# Patient Record
Sex: MALE | Race: WHITE | NOT HISPANIC OR LATINO | URBAN - METROPOLITAN AREA
[De-identification: names, ages, dates, MRNs, and addresses within clinical notes are randomized per-mention and may not be internally consistent; named-entity substitution may affect disease eponyms.]

---

## 2017-05-06 ENCOUNTER — GENERIC CONVERSION - ENCOUNTER (OUTPATIENT)
Dept: OTHER | Facility: OTHER | Age: 27
End: 2017-05-06

## 2017-05-30 ENCOUNTER — ALLSCRIPTS OFFICE VISIT (OUTPATIENT)
Dept: OTHER | Facility: OTHER | Age: 27
End: 2017-05-30

## 2017-06-06 ENCOUNTER — ALLSCRIPTS OFFICE VISIT (OUTPATIENT)
Dept: OTHER | Facility: OTHER | Age: 27
End: 2017-06-06

## 2017-06-08 ENCOUNTER — ALLSCRIPTS OFFICE VISIT (OUTPATIENT)
Dept: OTHER | Facility: OTHER | Age: 27
End: 2017-06-08

## 2017-07-10 ENCOUNTER — ALLSCRIPTS OFFICE VISIT (OUTPATIENT)
Dept: OTHER | Facility: OTHER | Age: 27
End: 2017-07-10

## 2017-07-20 ENCOUNTER — APPOINTMENT (OUTPATIENT)
Dept: RADIOLOGY | Facility: CLINIC | Age: 27
End: 2017-07-20
Payer: COMMERCIAL

## 2017-07-20 ENCOUNTER — ALLSCRIPTS OFFICE VISIT (OUTPATIENT)
Dept: OTHER | Facility: OTHER | Age: 27
End: 2017-07-20

## 2017-07-20 DIAGNOSIS — M25.519 PAIN IN SHOULDER: ICD-10-CM

## 2017-07-20 DIAGNOSIS — G25.89 OTHER SPECIFIED EXTRAPYRAMIDAL AND MOVEMENT DISORDERS: ICD-10-CM

## 2017-07-20 PROCEDURE — 73030 X-RAY EXAM OF SHOULDER: CPT

## 2018-01-10 NOTE — PROGRESS NOTES
Chief Complaint  Pt  in office today for a PPD read  Negative result-0 00mm induration  Active Problems    1  Acute bacterial conjunctivitis of right eye (372 03) (H10 31)   2  Acute sinusitis (461 9) (J01 90)   3  Arthralgia (719 40) (M25 50)   4  Need for Tdap vaccination (V06 1) (Z23)   5  Otitic barotrauma (993 0) (T70 0XXA)   6  PPD screening test (V74 1) (Z11 1)   7  Screening for hyperlipidemia (V77 91) (Z13 220)   8  Tonsil stone (474 8) (J35 8)    Current Meds   1  No Reported Medications Recorded    Allergies    1   No Known Drug Allergies    Plan  PPD screening test    · PPD    Signatures   Electronically signed by : BOLA Fuller ; Jun 8 2017  2:39PM EST

## 2018-01-10 NOTE — PROGRESS NOTES
Chief Complaint  Pt  in office today for a polio vaccine  Active Problems    1  Acute bacterial conjunctivitis of right eye (372 03) (H10 31)   2  Acute sinusitis (461 9) (J01 90)   3  Arthralgia (719 40) (M25 50)   4  Need for polio vaccination (V04 0) (Z23)   5  Need for Tdap vaccination (V06 1) (Z23)   6  Otitic barotrauma (993 0) (T70 0XXA)   7  PPD screening test (V74 1) (Z11 1)   8  Screening for hyperlipidemia (V77 91) (Z13 220)   9  Tonsil stone (474 8) (J35 8)    Current Meds   1  No Reported Medications Recorded    Allergies    1  No Known Drug Allergies    Plan  Need for polio vaccination    · IPV    Future Appointments    Date/Time Provider Specialty Site   07/20/2017 04:00 PM BOLA Hill   Orthopedic Surgery [facility]     Signatures   Electronically signed by : BOLA Tatum ; Jul 10 2017  6:29PM EST

## 2018-01-13 VITALS
HEIGHT: 76 IN | SYSTOLIC BLOOD PRESSURE: 126 MMHG | BODY MASS INDEX: 24.96 KG/M2 | WEIGHT: 205.01 LBS | HEART RATE: 99 BPM | DIASTOLIC BLOOD PRESSURE: 84 MMHG | OXYGEN SATURATION: 97 %

## 2018-01-14 VITALS
HEIGHT: 76 IN | BODY MASS INDEX: 24.96 KG/M2 | WEIGHT: 205 LBS | SYSTOLIC BLOOD PRESSURE: 122 MMHG | HEART RATE: 81 BPM | DIASTOLIC BLOOD PRESSURE: 82 MMHG

## 2018-01-14 NOTE — PROGRESS NOTES
Assessment    1  Encounter for preventive health examination (V70 0) (Z00 00)    Discussion/Summary  Impression: health maintenance visit  Prostate cancer screening: PSA is not indicated  Testicular cancer screening: the risks and benefits of testicular cancer screening were discussed  Colorectal cancer screening: colorectal cancer screening is not indicated  The immunizations are up to date  Patient is in good health for employment  Chief Complaint  Pt  in office today for a check up  Pt  states he needs Hep A, Hep B, TDAP, and PPD  Pt  may need HEP A prescription since Adult Hep A is not carried in office  History of Present Illness  HM, Adult Male:   General Health:   Screening: cancer screening reviewed and current  metabolic screening reviewed and current  risk screening reviewed and current  HPI: Comes in for preemployment physical for working with the [employer]  Active Problems    1  Acute bacterial conjunctivitis of right eye (372 03) (H10 31)   2  Acute sinusitis (461 9) (J01 90)   3  Arthralgia (719 40) (M25 50)   4  Otitic barotrauma (993 0) (T70 0XXA)   5  Screening for hyperlipidemia (V77 91) (Z13 220)   6  Tonsil stone (474 8) (J35 8)    Family History  Mother    · No pertinent family history  Father    · No pertinent family history    Social History    · Never a smoker    Current Meds   1  No Reported Medications Recorded    Allergies    1  No Known Drug Allergies    Vitals   Recorded: 06Jun2017 11:41AM   Heart Rate 77   Systolic 045   Diastolic 82   Height 6 ft 4 in   Weight 201 lb    BMI Calculated 24 47   BSA Calculated 2 22   O2 Saturation 98     Physical Exam    Constitutional   General appearance: No acute distress, well appearing and well nourished  Eyes   Conjunctiva and lids: No swelling, erythema, or discharge  Pupils and irises: Equal, round and reactive to light      Ears, Nose, Mouth, and Throat   External inspection of ears and nose: Normal     Otoscopic examination: Tympanic membrance translucent with normal light reflex  Canals patent without erythema  Oropharynx: Normal with no erythema, edema, exudate or lesions  Pulmonary   Respiratory effort: No increased work of breathing or signs of respiratory distress  Auscultation of lungs: Clear to auscultation  Cardiovascular   Palpation of heart: Normal PMI, no thrills  Auscultation of heart: Normal rate and rhythm, normal S1 and S2, without murmurs  Examination of extremities for edema and/or varicosities: Normal     Abdomen   Abdomen: Non-tender, no masses  Liver and spleen: No hepatomegaly or splenomegaly  Lymphatic   Palpation of lymph nodes in neck: No lymphadenopathy  Musculoskeletal   Gait and station: Normal     Digits and nails: Normal without clubbing or cyanosis  Inspection/palpation of joints, bones, and muscles: Normal     Skin   Skin and subcutaneous tissue: Normal without rashes or lesions  Neurologic   Cranial nerves: Cranial nerves 2-12 intact  Reflexes: 2+ and symmetric  Sensation: No sensory loss      Psychiatric   Orientation to person, place and time: Normal     Mood and affect: Normal        Signatures   Electronically signed by : BOLA [place] ; Jun 6 2017 12:07PM EST                       (Author)

## 2018-01-22 VITALS
HEIGHT: 76 IN | BODY MASS INDEX: 24.48 KG/M2 | SYSTOLIC BLOOD PRESSURE: 130 MMHG | HEART RATE: 77 BPM | WEIGHT: 201 LBS | DIASTOLIC BLOOD PRESSURE: 82 MMHG | OXYGEN SATURATION: 98 %

## 2023-12-20 ENCOUNTER — EVALUATION (OUTPATIENT)
Dept: PHYSICAL THERAPY | Facility: CLINIC | Age: 33
End: 2023-12-20
Payer: COMMERCIAL

## 2023-12-20 VITALS — DIASTOLIC BLOOD PRESSURE: 98 MMHG | SYSTOLIC BLOOD PRESSURE: 141 MMHG

## 2023-12-20 DIAGNOSIS — M54.2 NECK PAIN: Primary | ICD-10-CM

## 2023-12-20 PROCEDURE — 97161 PT EVAL LOW COMPLEX 20 MIN: CPT | Performed by: PHYSICAL THERAPIST

## 2023-12-20 PROCEDURE — 97110 THERAPEUTIC EXERCISES: CPT | Performed by: PHYSICAL THERAPIST
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
## 2023-12-27 ENCOUNTER — OFFICE VISIT (OUTPATIENT)
Dept: PHYSICAL THERAPY | Facility: CLINIC | Age: 33
End: 2023-12-27
Payer: COMMERCIAL

## 2023-12-27 DIAGNOSIS — M54.2 NECK PAIN: Primary | ICD-10-CM

## 2023-12-27 PROCEDURE — 97112 NEUROMUSCULAR REEDUCATION: CPT | Performed by: PHYSICAL THERAPIST

## 2023-12-27 PROCEDURE — 97140 MANUAL THERAPY 1/> REGIONS: CPT | Performed by: PHYSICAL THERAPIST

## 2023-12-27 PROCEDURE — 97110 THERAPEUTIC EXERCISES: CPT | Performed by: PHYSICAL THERAPIST

## 2024-01-03 ENCOUNTER — OFFICE VISIT (OUTPATIENT)
Dept: PHYSICAL THERAPY | Facility: CLINIC | Age: 34
End: 2024-01-03
Payer: COMMERCIAL

## 2024-01-03 DIAGNOSIS — M54.2 NECK PAIN: Primary | ICD-10-CM

## 2024-01-03 PROCEDURE — 97110 THERAPEUTIC EXERCISES: CPT | Performed by: PHYSICAL THERAPIST

## 2024-01-03 PROCEDURE — 97140 MANUAL THERAPY 1/> REGIONS: CPT | Performed by: PHYSICAL THERAPIST

## 2024-01-03 NOTE — PROGRESS NOTES
"Daily Note     Today's date: 1/3/2024  Patient name: Yang Soliman  : 1990  MRN: [id number]  Referring provider: Justin Ruiz MD  Dx:   Encounter Diagnosis     ICD-10-CM    1. Neck pain  M54.2                      Subjective: Pt reports he is 60-70% improved since beginning PT. He reports only two instances since IE of having a recurrence of his posterior head pain. He states that he is doing his HEP.       Objective: See treatment diary below      Assessment: Tolerated treatment well. ROM now WNL without recreation of symptoms. Assessed PAIVM- only slight tenderness at the subocc mm, otherwise WNL. Reviewed HEP and work ergonomics. Patient would benefit from continued PT      Plan: Continue per plan of care.  Progress treatment as tolerated.       Precautions: None    POC expires Unit limit Auth Expiration date PT/OT/ST + Visit Limit?   24 4 none 50                             Therapeutic exercise = (TE)  Therapeutic activity= (TA)  Manual Therapy= (MT)  Neuromuscular re-education- (NRE)  Gait training= (GT)    MT 1. R UPA C1- gd III RAMESH 3x1'  *L ROT- 60d  TE 2. Standing OA stretch against wall- 10x5\"  *L ROT- 65d    1/3:  TE 1. Reassessment of cervical ROM- WNL in all directions and no increase in symptoms with OP  MT 2. PAIVM assessment- slight tenderness bilateral subocc muscles- however, overall minimal response  TE 3. Standing OA stretch against wall- 10x5\"- discussion on how to rotate the head to make it unilateral   TE 4. Discussion on work ergonomics    [email or web address]  Access Code: [email or web address]           "

## 2024-01-18 ENCOUNTER — OFFICE VISIT (OUTPATIENT)
Dept: PHYSICAL THERAPY | Facility: CLINIC | Age: 34
End: 2024-01-18
Payer: COMMERCIAL

## 2024-01-18 DIAGNOSIS — M54.2 NECK PAIN: Primary | ICD-10-CM

## 2024-01-18 PROCEDURE — 97110 THERAPEUTIC EXERCISES: CPT | Performed by: PHYSICAL THERAPIST

## 2024-10-01 ENCOUNTER — HOSPITAL ENCOUNTER (OUTPATIENT)
Dept: ULTRASOUND IMAGING | Facility: HOSPITAL | Age: 34
Discharge: HOME | End: 2024-10-01
Payer: COMMERCIAL

## 2024-10-01 DIAGNOSIS — K40.90 UNILATERAL INGUINAL HERNIA, WITHOUT OBSTRUCTION OR GANGRENE, NOT SPECIFIED AS RECURRENT: ICD-10-CM

## 2024-10-01 PROCEDURE — 76705 ECHO EXAM OF ABDOMEN: CPT

## 2024-11-12 ENCOUNTER — OFFICE VISIT (OUTPATIENT)
Dept: SURGERY | Facility: CLINIC | Age: 34
End: 2024-11-12
Payer: COMMERCIAL

## 2024-11-12 VITALS
HEART RATE: 110 BPM | BODY MASS INDEX: 25.82 KG/M2 | HEIGHT: 76 IN | TEMPERATURE: 97.5 F | WEIGHT: 212 LBS | RESPIRATION RATE: 18 BRPM | DIASTOLIC BLOOD PRESSURE: 72 MMHG | OXYGEN SATURATION: 96 % | SYSTOLIC BLOOD PRESSURE: 120 MMHG

## 2024-11-12 DIAGNOSIS — K40.90 RIGHT INGUINAL HERNIA: Primary | ICD-10-CM

## 2024-11-12 PROCEDURE — 99203 OFFICE O/P NEW LOW 30 MIN: CPT | Performed by: SURGERY

## 2024-11-12 RX ORDER — HEPARIN SODIUM 5000 [USP'U]/ML
5000 INJECTION, SOLUTION INTRAVENOUS; SUBCUTANEOUS ONCE
OUTPATIENT
Start: 2024-11-12 | End: 2024-11-12

## 2024-11-12 RX ORDER — SODIUM CHLORIDE, SODIUM LACTATE, POTASSIUM CHLORIDE, CALCIUM CHLORIDE 600; 310; 30; 20 MG/100ML; MG/100ML; MG/100ML; MG/100ML
125 INJECTION, SOLUTION INTRAVENOUS CONTINUOUS
OUTPATIENT
Start: 2024-11-12

## 2024-11-12 NOTE — PROGRESS NOTES
Consult- General Surgery   Yang Soliman 34 y.o. male MRN: [id number]  Unit/Bed#:  Encounter: [id number]    Assessment & Plan     Assessment:  Right inguinal hernia  Plan:  In light of the size and symptoms I advised the patient to undergo laparoscopic preperitoneal right inguinal hernia repair with mesh in the near future.  I discussed the operative procedure, risk, benefits and alternatives, but not limited to bleeding, infection after surgery, recurrence, injury to adjacent organs, need for furher operations, loss of time from work, the patient understood and agreed to proceed with the above surgery.    History of Present Illness     HPI:  Yang Soliman is a 34 y.o. male who presents to my office for evaluation of right inguinal hernia.  The patient stated that he noted a bulge from the right groin after taking a shower.  He does have occasional discomfort, he denies having any nausea, vomiting, diarrhea, constipation or urinary symptoms.  He was seen by his primary care physician and referred to us for surgical evaluation.  He also had an ultrasound of the groin revealing right inguinal hernia.    Review of Systems  The rest of the review of system total of 10 were negative except for the HPI.    Historical Information   History reviewed. No pertinent past medical history.  Past Surgical History:   Procedure Laterality Date    HAND SURGERY Right     WISDOM TOOTH EXTRACTION       Social History   Social History     Substance and Sexual Activity   Alcohol Use Yes    Comment: occas     Social History     Substance and Sexual Activity   Drug Use Never     Social History     Tobacco Use   Smoking Status Never    Passive exposure: Never   Smokeless Tobacco Never     Family History: Family history non-contributory    Meds/Allergies   all medications and allergies reviewed   No current outpatient medications on file.  No Known Allergies    Objective     Current Vitals:   Blood Pressure: 120/72 (11/12/24 1054)  Pulse:  "(!) 110 (11/12/24 1054)  Temperature: 97.5 °F (36.4 °C) (11/12/24 1054)  Respirations: 18 (11/12/24 1054)  Height: 6' 4\" (193 cm) (11/12/24 1054)  Weight - Scale: 96.2 kg (212 lb) (11/12/24 1054)  SpO2: 96 % (11/12/24 1054)    Physical Exam  Vitals and nursing note reviewed.   Constitutional:       General: He is not in acute distress.  Cardiovascular:      Rate and Rhythm: Regular rhythm. Tachycardia present.   Pulmonary:      Effort: No respiratory distress.      Breath sounds: Normal breath sounds.   Abdominal:      Palpations: Abdomen is soft. There is no mass.      Tenderness: There is no abdominal tenderness.      Comments: There is an obvious bulge in the right groin consistent with a right inguinal hernia.  Digital examination of the left inguinal canal with Valsalva maneuver failed to reveal left inguinal hernia.   Skin:     General: Skin is warm.      Coloration: Skin is not jaundiced.      Findings: No erythema or rash.   Neurological:      Mental Status: He is alert and oriented to person, place, and time.      Cranial Nerves: No cranial nerve deficit.   Psychiatric:         Mood and Affect: Mood normal.         Behavior: Behavior normal.       Imaging: Results Review Statement: I personally reviewed the following image studies in PACS and associated radiology reports: Ultrasound(s). My interpretation of the radiology images/reports is: Agree with report.  Procedure: US groin/inguinal area    Result Date: 10/2/2024  Narrative: ABDOMINAL WALL/INGUINAL ULTRASOUND INDICATION: K40.90: Unilateral inguinal hernia, without obstruction or gangrene, not specified as recurrent. Patient reports bulging in the right inguinal region, more prominent when standing. COMPARISON: No similar prior studies available for direct comparison at time of dictation. TECHNIQUE: Real-time ultrasound of the right inguinal region was performed with a high-frequency transducer with both volumetric sweeps and still imaging techniques. " FINDINGS: There is a small, fat-containing right inguinal hernia corresponding to the palpable abnormality.     Impression: Small, fat-containing right inguinal hernia corresponding to the palpable abnormality. Workstation performed: [id number]

## 2024-11-12 NOTE — H&P (VIEW-ONLY)
Consult- General Surgery   Yang Soliman 34 y.o. male MRN: [id number]  Unit/Bed#:  Encounter: [id number]    Assessment & Plan     Assessment:  Right inguinal hernia  Plan:  In light of the size and symptoms I advised the patient to undergo laparoscopic preperitoneal right inguinal hernia repair with mesh in the near future.  I discussed the operative procedure, risk, benefits and alternatives, but not limited to bleeding, infection after surgery, recurrence, injury to adjacent organs, need for furher operations, loss of time from work, the patient understood and agreed to proceed with the above surgery.    History of Present Illness     HPI:  Yang Soliman is a 34 y.o. male who presents to my office for evaluation of right inguinal hernia.  The patient stated that he noted a bulge from the right groin after taking a shower.  He does have occasional discomfort, he denies having any nausea, vomiting, diarrhea, constipation or urinary symptoms.  He was seen by his primary care physician and referred to us for surgical evaluation.  He also had an ultrasound of the groin revealing right inguinal hernia.    Review of Systems  The rest of the review of system total of 10 were negative except for the HPI.    Historical Information   History reviewed. No pertinent past medical history.  Past Surgical History:   Procedure Laterality Date    HAND SURGERY Right     WISDOM TOOTH EXTRACTION       Social History   Social History     Substance and Sexual Activity   Alcohol Use Yes    Comment: occas     Social History     Substance and Sexual Activity   Drug Use Never     Social History     Tobacco Use   Smoking Status Never    Passive exposure: Never   Smokeless Tobacco Never     Family History: Family history non-contributory    Meds/Allergies   all medications and allergies reviewed   No current outpatient medications on file.  No Known Allergies    Objective     Current Vitals:   Blood Pressure: 120/72 (11/12/24 1054)  Pulse:  "(!) 110 (11/12/24 1054)  Temperature: 97.5 °F (36.4 °C) (11/12/24 1054)  Respirations: 18 (11/12/24 1054)  Height: 6' 4\" (193 cm) (11/12/24 1054)  Weight - Scale: 96.2 kg (212 lb) (11/12/24 1054)  SpO2: 96 % (11/12/24 1054)    Physical Exam  Vitals and nursing note reviewed.   Constitutional:       General: He is not in acute distress.  Cardiovascular:      Rate and Rhythm: Regular rhythm. Tachycardia present.   Pulmonary:      Effort: No respiratory distress.      Breath sounds: Normal breath sounds.   Abdominal:      Palpations: Abdomen is soft. There is no mass.      Tenderness: There is no abdominal tenderness.      Comments: There is an obvious bulge in the right groin consistent with a right inguinal hernia.  Digital examination of the left inguinal canal with Valsalva maneuver failed to reveal left inguinal hernia.   Skin:     General: Skin is warm.      Coloration: Skin is not jaundiced.      Findings: No erythema or rash.   Neurological:      Mental Status: He is alert and oriented to person, place, and time.      Cranial Nerves: No cranial nerve deficit.   Psychiatric:         Mood and Affect: Mood normal.         Behavior: Behavior normal.       Imaging: Results Review Statement: I personally reviewed the following image studies in PACS and associated radiology reports: Ultrasound(s). My interpretation of the radiology images/reports is: Agree with report.  Procedure: US groin/inguinal area    Result Date: 10/2/2024  Narrative: ABDOMINAL WALL/INGUINAL ULTRASOUND INDICATION: K40.90: Unilateral inguinal hernia, without obstruction or gangrene, not specified as recurrent. Patient reports bulging in the right inguinal region, more prominent when standing. COMPARISON: No similar prior studies available for direct comparison at time of dictation. TECHNIQUE: Real-time ultrasound of the right inguinal region was performed with a high-frequency transducer with both volumetric sweeps and still imaging techniques. " FINDINGS: There is a small, fat-containing right inguinal hernia corresponding to the palpable abnormality.     Impression: Small, fat-containing right inguinal hernia corresponding to the palpable abnormality. Workstation performed: [id number]

## 2024-11-13 LAB
ALBUMIN SERPL-MCNC: 4.9 G/DL (ref 3.6–5.1)
ALBUMIN/GLOB SERPL: 2 (CALC) (ref 1–2.5)
ALP SERPL-CCNC: 69 U/L (ref 36–130)
ALT SERPL-CCNC: 43 U/L (ref 9–46)
AST SERPL-CCNC: 27 U/L (ref 10–40)
BASOPHILS # BLD AUTO: 40 CELLS/UL (ref 0–200)
BASOPHILS NFR BLD AUTO: 0.6 %
BILIRUB SERPL-MCNC: 0.8 MG/DL (ref 0.2–1.2)
BUN SERPL-MCNC: 8 MG/DL (ref 7–25)
BUN/CREAT SERPL: NORMAL (CALC) (ref 6–22)
CALCIUM SERPL-MCNC: 9.6 MG/DL (ref 8.6–10.3)
CHLORIDE SERPL-SCNC: 100 MMOL/L (ref 98–110)
CO2 SERPL-SCNC: 31 MMOL/L (ref 20–32)
CREAT SERPL-MCNC: 0.97 MG/DL (ref 0.6–1.26)
EOSINOPHIL # BLD AUTO: 271 CELLS/UL (ref 15–500)
EOSINOPHIL NFR BLD AUTO: 4.1 %
ERYTHROCYTE [DISTWIDTH] IN BLOOD BY AUTOMATED COUNT: 12.5 % (ref 11–15)
GFR/BSA.PRED SERPLBLD CYS-BASED-ARV: 104 ML/MIN/1.73M2
GLOBULIN SER CALC-MCNC: 2.5 G/DL (CALC) (ref 1.9–3.7)
GLUCOSE SERPL-MCNC: 83 MG/DL (ref 65–99)
HCT VFR BLD AUTO: 49.7 % (ref 38.5–50)
HGB BLD-MCNC: 17.5 G/DL (ref 13.2–17.1)
LYMPHOCYTES # BLD AUTO: 1822 CELLS/UL (ref 850–3900)
LYMPHOCYTES NFR BLD AUTO: 27.6 %
MCH RBC QN AUTO: 31 PG (ref 27–33)
MCHC RBC AUTO-ENTMCNC: 35.2 G/DL (ref 32–36)
MCV RBC AUTO: 88 FL (ref 80–100)
MONOCYTES # BLD AUTO: 653 CELLS/UL (ref 200–950)
MONOCYTES NFR BLD AUTO: 9.9 %
NEUTROPHILS # BLD AUTO: 3815 CELLS/UL (ref 1500–7800)
NEUTROPHILS NFR BLD AUTO: 57.8 %
PLATELET # BLD AUTO: 181 THOUSAND/UL (ref 140–400)
PMV BLD REES-ECKER: 10.5 FL (ref 7.5–12.5)
POTASSIUM SERPL-SCNC: 4 MMOL/L (ref 3.5–5.3)
PROT SERPL-MCNC: 7.4 G/DL (ref 6.1–8.1)
RBC # BLD AUTO: 5.65 MILLION/UL (ref 4.2–5.8)
SODIUM SERPL-SCNC: 140 MMOL/L (ref 135–146)
WBC # BLD AUTO: 6.6 THOUSAND/UL (ref 3.8–10.8)

## 2024-11-21 NOTE — PRE-PROCEDURE INSTRUCTIONS
No outpatient medications have been marked as taking for the 11/26/24 encounter (Hospital Encounter).    Pt does not take any medications.    Medication instructions for day surgery reviewed. Please use only a sip of water to take your instructed medications. Avoid all over the counter vitamins, supplements and NSAIDS for one week prior to surgery per anesthesia guidelines. Tylenol is ok to take as needed.     You will receive a call one business day prior to surgery with an arrival time and hospital directions. If your surgery is scheduled on a Monday, the hospital will be calling you on the Friday prior to your surgery. If you have not heard from anyone by 8pm, please call the hospital supervisor through the hospital  at [phone]. ([place] [phone] or [place] [phone]).    Do not eat or drink anything after midnight the night before your surgery, including candy, mints, lifesavers, or chewing gum. Do not drink alcohol 24hrs before your surgery. Try not to smoke at least 24hrs before your surgery.       Follow the pre surgery showering instructions as listed in the “My Surgical Experience Booklet” or otherwise provided by your surgeon's office. Do not use a blade to shave the surgical area 1 week before surgery. It is okay to use a clean electric clippers up to 24 hours before surgery. Do not apply any lotions, creams, including makeup, cologne, deodorant, or perfumes after showering on the day of your surgery. Do not use dry shampoo, hair spray, hair gel, or any type of hair products.     No contact lenses, eye make-up, or artificial eyelashes. Remove nail polish, including gel polish, and any artificial, gel, or acrylic nails if possible. Remove all jewelry including rings and body piercing jewelry.     Wear causal clothing that is easy to take on and off. Consider your type of surgery.    Keep any valuables, jewelry, piercings at home. Please bring any specially ordered equipment (sling,  braces) if indicated.    Arrange for a responsible person to drive you to and from the hospital on the day of your surgery. Please confirm the visitor policy for the day of your procedure when you receive your phone call with an arrival time.     Call the surgeon's office with any new illnesses, exposures, or additional questions prior to surgery.    Please reference your “My Surgical Experience Booklet” for additional information to prepare for your upcoming surgery.     No

## 2024-11-26 ENCOUNTER — ANESTHESIA EVENT (OUTPATIENT)
Dept: PERIOP | Facility: HOSPITAL | Age: 34
End: 2024-11-26
Payer: COMMERCIAL

## 2024-11-26 ENCOUNTER — ANESTHESIA (OUTPATIENT)
Dept: PERIOP | Facility: HOSPITAL | Age: 34
End: 2024-11-26
Payer: COMMERCIAL

## 2024-11-26 ENCOUNTER — HOSPITAL ENCOUNTER (OUTPATIENT)
Facility: HOSPITAL | Age: 34
Setting detail: OUTPATIENT SURGERY
Discharge: HOME | End: 2024-11-26
Attending: SURGERY | Admitting: SURGERY
Payer: COMMERCIAL

## 2024-11-26 VITALS
SYSTOLIC BLOOD PRESSURE: 126 MMHG | RESPIRATION RATE: 21 BRPM | TEMPERATURE: 98 F | BODY MASS INDEX: 25.53 KG/M2 | OXYGEN SATURATION: 96 % | DIASTOLIC BLOOD PRESSURE: 84 MMHG | HEIGHT: 76 IN | HEART RATE: 84 BPM | WEIGHT: 209.66 LBS

## 2024-11-26 DIAGNOSIS — K40.90 RIGHT INGUINAL HERNIA: ICD-10-CM

## 2024-11-26 PROCEDURE — 49650 LAP ING HERNIA REPAIR INIT: CPT | Performed by: SURGERY

## 2024-11-26 PROCEDURE — 49650 LAP ING HERNIA REPAIR INIT: CPT | Performed by: CLINICAL NURSE SPECIALIST

## 2024-11-26 PROCEDURE — C1781 MESH (IMPLANTABLE): HCPCS | Performed by: SURGERY

## 2024-11-26 DEVICE — LAPAROSCOPIC SELF-FIXATING MESH POLYESTER WITH POLYLACTIC ACID GRIPS AND COLLAGEN FILM
Type: IMPLANTABLE DEVICE | Site: INGUINAL | Status: FUNCTIONAL
Brand: PROGRIP

## 2024-11-26 RX ORDER — MAGNESIUM HYDROXIDE 1200 MG/15ML
LIQUID ORAL AS NEEDED
Status: DISCONTINUED | OUTPATIENT
Start: 2024-11-26 | End: 2024-11-26 | Stop reason: HOSPADM

## 2024-11-26 RX ORDER — CEFAZOLIN SODIUM 2 G/50ML
2000 SOLUTION INTRAVENOUS ONCE
Status: COMPLETED | OUTPATIENT
Start: 2024-11-26 | End: 2024-11-26

## 2024-11-26 RX ORDER — FENTANYL CITRATE/PF 50 MCG/ML
25 SYRINGE (ML) INJECTION
Status: DISCONTINUED | OUTPATIENT
Start: 2024-11-26 | End: 2024-11-26 | Stop reason: HOSPADM

## 2024-11-26 RX ORDER — KETOROLAC TROMETHAMINE 30 MG/ML
INJECTION, SOLUTION INTRAMUSCULAR; INTRAVENOUS AS NEEDED
Status: DISCONTINUED | OUTPATIENT
Start: 2024-11-26 | End: 2024-11-26

## 2024-11-26 RX ORDER — SODIUM CHLORIDE, SODIUM LACTATE, POTASSIUM CHLORIDE, CALCIUM CHLORIDE 600; 310; 30; 20 MG/100ML; MG/100ML; MG/100ML; MG/100ML
125 INJECTION, SOLUTION INTRAVENOUS CONTINUOUS
Status: DISCONTINUED | OUTPATIENT
Start: 2024-11-26 | End: 2024-11-26 | Stop reason: HOSPADM

## 2024-11-26 RX ORDER — SODIUM CHLORIDE, SODIUM LACTATE, POTASSIUM CHLORIDE, CALCIUM CHLORIDE 600; 310; 30; 20 MG/100ML; MG/100ML; MG/100ML; MG/100ML
INJECTION, SOLUTION INTRAVENOUS CONTINUOUS PRN
Status: DISCONTINUED | OUTPATIENT
Start: 2024-11-26 | End: 2024-11-26

## 2024-11-26 RX ORDER — ACETAMINOPHEN 325 MG/1
975 TABLET ORAL EVERY 8 HOURS PRN
Status: DISCONTINUED | OUTPATIENT
Start: 2024-11-26 | End: 2024-11-26 | Stop reason: HOSPADM

## 2024-11-26 RX ORDER — FENTANYL CITRATE 50 UG/ML
INJECTION, SOLUTION INTRAMUSCULAR; INTRAVENOUS AS NEEDED
Status: DISCONTINUED | OUTPATIENT
Start: 2024-11-26 | End: 2024-11-26

## 2024-11-26 RX ORDER — ONDANSETRON 2 MG/ML
4 INJECTION INTRAMUSCULAR; INTRAVENOUS ONCE AS NEEDED
Status: DISCONTINUED | OUTPATIENT
Start: 2024-11-26 | End: 2024-11-26 | Stop reason: HOSPADM

## 2024-11-26 RX ORDER — TRAMADOL HYDROCHLORIDE 50 MG/1
50 TABLET ORAL EVERY 6 HOURS PRN
Status: DISCONTINUED | OUTPATIENT
Start: 2024-11-26 | End: 2024-11-26 | Stop reason: HOSPADM

## 2024-11-26 RX ORDER — HYDROMORPHONE HCL/PF 1 MG/ML
SYRINGE (ML) INJECTION AS NEEDED
Status: DISCONTINUED | OUTPATIENT
Start: 2024-11-26 | End: 2024-11-26

## 2024-11-26 RX ORDER — TRAMADOL HYDROCHLORIDE 50 MG/1
50 TABLET ORAL EVERY 6 HOURS PRN
Qty: 10 TABLET | Refills: 0 | Status: SHIPPED | OUTPATIENT
Start: 2024-11-26 | End: 2024-12-06

## 2024-11-26 RX ORDER — MIDAZOLAM HYDROCHLORIDE 2 MG/2ML
INJECTION, SOLUTION INTRAMUSCULAR; INTRAVENOUS AS NEEDED
Status: DISCONTINUED | OUTPATIENT
Start: 2024-11-26 | End: 2024-11-26

## 2024-11-26 RX ORDER — PROPOFOL 10 MG/ML
INJECTION, EMULSION INTRAVENOUS AS NEEDED
Status: DISCONTINUED | OUTPATIENT
Start: 2024-11-26 | End: 2024-11-26

## 2024-11-26 RX ORDER — HYDROMORPHONE HCL/PF 1 MG/ML
0.4 SYRINGE (ML) INJECTION
Status: DISCONTINUED | OUTPATIENT
Start: 2024-11-26 | End: 2024-11-26 | Stop reason: HOSPADM

## 2024-11-26 RX ORDER — ONDANSETRON 2 MG/ML
INJECTION INTRAMUSCULAR; INTRAVENOUS AS NEEDED
Status: DISCONTINUED | OUTPATIENT
Start: 2024-11-26 | End: 2024-11-26

## 2024-11-26 RX ORDER — LIDOCAINE HYDROCHLORIDE 10 MG/ML
INJECTION, SOLUTION EPIDURAL; INFILTRATION; INTRACAUDAL; PERINEURAL AS NEEDED
Status: DISCONTINUED | OUTPATIENT
Start: 2024-11-26 | End: 2024-11-26

## 2024-11-26 RX ORDER — DEXAMETHASONE SODIUM PHOSPHATE 10 MG/ML
INJECTION, SOLUTION INTRAMUSCULAR; INTRAVENOUS AS NEEDED
Status: DISCONTINUED | OUTPATIENT
Start: 2024-11-26 | End: 2024-11-26

## 2024-11-26 RX ORDER — ONDANSETRON 2 MG/ML
4 INJECTION INTRAMUSCULAR; INTRAVENOUS EVERY 8 HOURS PRN
Status: DISCONTINUED | OUTPATIENT
Start: 2024-11-26 | End: 2024-11-26 | Stop reason: HOSPADM

## 2024-11-26 RX ORDER — ROCURONIUM BROMIDE 10 MG/ML
INJECTION, SOLUTION INTRAVENOUS AS NEEDED
Status: DISCONTINUED | OUTPATIENT
Start: 2024-11-26 | End: 2024-11-26

## 2024-11-26 RX ORDER — HEPARIN SODIUM 5000 [USP'U]/ML
5000 INJECTION, SOLUTION INTRAVENOUS; SUBCUTANEOUS ONCE
Status: COMPLETED | OUTPATIENT
Start: 2024-11-26 | End: 2024-11-26

## 2024-11-26 RX ORDER — BUPIVACAINE HYDROCHLORIDE 2.5 MG/ML
INJECTION, SOLUTION EPIDURAL; INFILTRATION; INTRACAUDAL AS NEEDED
Status: DISCONTINUED | OUTPATIENT
Start: 2024-11-26 | End: 2024-11-26 | Stop reason: HOSPADM

## 2024-11-26 RX ADMIN — ONDANSETRON 4 MG: 2 INJECTION INTRAMUSCULAR; INTRAVENOUS at 11:33

## 2024-11-26 RX ADMIN — SODIUM CHLORIDE, SODIUM LACTATE, POTASSIUM CHLORIDE, AND CALCIUM CHLORIDE: .6; .31; .03; .02 INJECTION, SOLUTION INTRAVENOUS at 10:49

## 2024-11-26 RX ADMIN — SUGAMMADEX 200 MG: 100 INJECTION, SOLUTION INTRAVENOUS at 11:33

## 2024-11-26 RX ADMIN — HEPARIN SODIUM 5000 UNITS: 5000 INJECTION, SOLUTION INTRAVENOUS; SUBCUTANEOUS at 09:38

## 2024-11-26 RX ADMIN — PROPOFOL 200 MG: 10 INJECTION, EMULSION INTRAVENOUS at 10:52

## 2024-11-26 RX ADMIN — KETOROLAC TROMETHAMINE 15 MG: 30 INJECTION, SOLUTION INTRAMUSCULAR at 11:35

## 2024-11-26 RX ADMIN — FENTANYL CITRATE 100 MCG: 50 INJECTION, SOLUTION INTRAMUSCULAR; INTRAVENOUS at 10:51

## 2024-11-26 RX ADMIN — MIDAZOLAM HYDROCHLORIDE 2 MG: 1 INJECTION, SOLUTION INTRAMUSCULAR; INTRAVENOUS at 10:49

## 2024-11-26 RX ADMIN — ROCURONIUM BROMIDE 50 MG: 10 INJECTION, SOLUTION INTRAVENOUS at 10:53

## 2024-11-26 RX ADMIN — CEFAZOLIN SODIUM 2000 MG: 2 SOLUTION INTRAVENOUS at 10:59

## 2024-11-26 RX ADMIN — DEXMEDETOMIDINE HYDROCHLORIDE 8 MCG: 100 INJECTION, SOLUTION, CONCENTRATE INTRAVENOUS at 10:59

## 2024-11-26 RX ADMIN — TRAMADOL HYDROCHLORIDE 50 MG: 50 TABLET, COATED ORAL at 13:35

## 2024-11-26 RX ADMIN — LIDOCAINE HYDROCHLORIDE 50 MG: 10 INJECTION, SOLUTION EPIDURAL; INFILTRATION; INTRACAUDAL; PERINEURAL at 10:51

## 2024-11-26 RX ADMIN — DEXMEDETOMIDINE HYDROCHLORIDE 8 MCG: 100 INJECTION, SOLUTION, CONCENTRATE INTRAVENOUS at 11:06

## 2024-11-26 RX ADMIN — DEXAMETHASONE SODIUM PHOSPHATE 10 MG: 10 INJECTION INTRAMUSCULAR; INTRAVENOUS at 10:49

## 2024-11-26 RX ADMIN — HYDROMORPHONE HYDROCHLORIDE 0.5 MG: 1 INJECTION, SOLUTION INTRAMUSCULAR; INTRAVENOUS; SUBCUTANEOUS at 11:40

## 2024-11-26 NOTE — ANESTHESIA PREPROCEDURE EVALUATION
"\"34 y.o. male who presents to my office for evaluation of right inguinal hernia.  The patient stated that he noted a bulge from the right groin after taking a shower.  He does have occasional discomfort, he denies having any nausea, vomiting, diarrhea, constipation or urinary symptoms.  He was seen by his primary care physician and referred to us for surgical evaluation.  He also had an ultrasound of the groin revealing right inguinal hernia. \"      Procedure:  REPAIR HERNIA INGUINAL, LAPAROSCOPIC preperitoneal right with mesh (Right: Groin)    Relevant Problems   No relevant active problems        Physical Exam    Airway    Mallampati score: II  TM Distance: >3 FB  Neck ROM: full     Dental   No notable dental hx     Cardiovascular  Rhythm: regular, No weak pulses    Pulmonary   No stridor    Other Findings        Anesthesia Plan  ASA Score- 1     Anesthesia Type- general with ASA Monitors.         Additional Monitors:     Airway Plan: ETT.           Plan Factors-    Chart reviewed.   Existing labs reviewed. Patient summary reviewed.                  Induction- intravenous.    Postoperative Plan- Plan for postoperative opioid use. Planned trial extubation    Perioperative Resuscitation Plan - Level 1 - Full Code.       Informed Consent- Anesthetic plan and risks discussed with patient.  I personally reviewed this patient with the CRNA. Discussed and agreed on the Anesthesia Plan with the CRNA..        "

## 2024-11-26 NOTE — ANESTHESIA POSTPROCEDURE EVALUATION
Post-Op Assessment Note    CV Status:  Stable  Pain Score: 0    Pain management: adequate       Mental Status:  Alert and awake   Hydration Status:  Euvolemic   PONV Controlled:  Controlled   Airway Patency:  Patent     Post Op Vitals Reviewed: Yes    No anethesia notable event occurred.    Staff: CRNA           Last Filed PACU Vitals:  Vitals Value Taken Time   Temp 97.4    Pulse 88    /79    Resp 14    SpO2 98        Modified Dena:  No data recorded

## 2024-11-26 NOTE — DISCHARGE INSTR - AVS FIRST PAGE
SURGERY INSTRUCTIONS    No diet restriction for this surgery  May shower every day starting tomorrow  Remove plastic dressings in 3 days  Remove strips in 7 days  Nothing should be covering incisions 7 days after surgery  Call office to make an appointment in 2 weeks [phone]  Call office with any issues regarding the surgery  You are encourage to walk as much as possible  No driving, heavy lifting or strenuous exercise for one week  Resume home medications starting today  Resume blood thinners starting tomorrow.  (Aspirin, Coumadin, Eliquis, Xarelto)  Apply ice to the incisions. 10 minutes every hour for 2 days  May take Tramadol, regular Tylenol or ibuprofen for pain. Alternating narcotics with ibuprofen or Advil will have better pain control.  May take Colace for constipation  If you experience urinary retention, please contact our office to be treated  After LAPAROSCOPIC surgery you may experience shoulder pain that usually resolves in 2-3 days or taking plain Tylenol  Please let us know how we did, you will receive a survey in the mail or via email.  Please respond to help us improved.

## 2024-11-26 NOTE — OP NOTE
OPERATIVE REPORT  PATIENT NAME: Yang Soliman    :  1990  MRN: [id number]  Pt Location: [facility] OR ROOM 03    SURGERY DATE: 2024    Surgeons and Role:     * Robin Davis MD - Primary     * Sreedhar Clemons PA-C - Assisting    Preop Diagnosis:  Right inguinal hernia [K40.90]    Post-Op Diagnosis Codes:     * Right inguinal hernia [K40.90]    Procedure(s):  Right - REPAIR HERNIA INGUINAL. LAPAROSCOPIC preperitoneal right with mesh    Specimen(s):  None    Estimated Blood Loss:   Minimal    Drains:  None    Anesthesia Type:   General    Operative Indications:  Right inguinal hernia [K40.90]    Operative Findings:  Moderate size right indirect inguinal hernia.    Complications:   None    Procedure and Technique:  The patient was identified he was placed in the operating table in a supine position. After adequate anesthesia induction and satisfactory endotracheal intubation the abdomen and perineum were prepped and draped in sterile usual fashion with chlor prep. Timeout was called the patient was identified as well as surgical sites.    An infra umbilical incision was made with a scalpel, taken down through the subcutaneous tissue with electrocautery. The fascia of the rectus muscle was opened with electrocautery in a transverse fashion. The rectus muscle was retracted laterally and with blunt finger dissection a space was created posterior to the rectus muscle.    The balloon dissector was inserted into the preperitoneal space and insufflated under direct vision. The balloon was deflated and subsequently retrieved. The structural balloon was placed into the preperitoneal space and insufflated then the preperitoneal space was insufflated with CO2.  At this point the scope was advanced and then we proceeded to place a 5 mm trocar in the suprapubic area ×2 under direct vision.     Our dissection was started by identifying symphysis pubis and Abelardo's ligament on the right side, the cord structures were  identified by gently pulling on the right testicle. Dissection was carried out between the inferior epigastric vessels and the cord structures going laterally until the lateral wall was identified. Then I proceeded to dissect medially towards the cord structure. The large hernia sac was identified and dissected from the indirect defect, then the peritoneum was dissected off the cord structure as high as the superior Iliac spine.     10 x 15 cm Pro  mesh was placed and tacked it into anterior abdominal wall allowing mesh to be flat completely.  Ports were removed under direct vision with no evidence of bleeding from the abdominal wall. The structural balloon was deflated and subsequently retrieved. The infra umbilical port site fascia was closed with 0 Vicryl in an interrupted figure-of-eight fashion. The subcutaneous tissue was infiltrated with 0.5% Marcaine and the skin was closed with a 4-0 Vicryl in an interrupted subcuticular fashion. Sterile dressing were applied.    At the end of the case instrument, needles, sponges counts were correct. The patient tolerated the procedure well and then he was transferred to recovery room in a stable conditions.   I was present for the entire procedure., A qualified resident physician was not available., and A physician assistant was required during the procedure for retraction, tissue handling, dissection and suturing.    Patient Disposition:  PACU , hemodynamically stable, and extubated and stable             SIGNATURE: Robin Davis MD  DATE: November 26, 2024  TIME: 11:38 AM

## 2024-12-04 NOTE — ANESTHESIA POSTPROCEDURE EVALUATION
Post-Op Assessment Note    CV Status:  Stable  Pain Score: 3    Pain management: adequate       Mental Status:  Alert and awake   Hydration Status:  Euvolemic   PONV Controlled:  Controlled   Airway Patency:  Patent     Post Op Vitals Reviewed: Yes    No anethesia notable event occurred.    Staff: Anesthesiologist           Last Filed PACU Vitals:  Vitals Value Taken Time   Temp 98.1 °F (36.7 °C) 11/26/24 1155   Pulse 80 11/26/24 1230   /77 11/26/24 1230   Resp 14 11/26/24 1230   SpO2 93 % 11/26/24 1230       Modified Dena:  No data recorded

## 2024-12-09 ENCOUNTER — OFFICE VISIT (OUTPATIENT)
Dept: SURGERY | Facility: CLINIC | Age: 34
End: 2024-12-09

## 2024-12-09 VITALS
HEIGHT: 76 IN | BODY MASS INDEX: 26.04 KG/M2 | OXYGEN SATURATION: 93 % | HEART RATE: 112 BPM | DIASTOLIC BLOOD PRESSURE: 68 MMHG | RESPIRATION RATE: 18 BRPM | SYSTOLIC BLOOD PRESSURE: 118 MMHG | WEIGHT: 213.8 LBS | TEMPERATURE: 98.2 F

## 2024-12-09 DIAGNOSIS — Z09 POSTOPERATIVE FOLLOW-UP: Primary | ICD-10-CM

## 2024-12-09 PROCEDURE — 99024 POSTOP FOLLOW-UP VISIT: CPT | Performed by: SURGERY

## 2024-12-09 NOTE — PROGRESS NOTES
"Name: Yang Soliman      : 1990      MRN: [id number]  Encounter Provider: Robin Davis MD  Encounter Date: 2024   Encounter department: [facility]  :  Assessment & Plan  Postoperative follow-up  Status post laparoscopic preperitoneal right inguinal hernia repair with mesh, improved     Patient has recovered completely from the surgical standpoint.  He is discharged from my care and he is allowed to go back to his usual physical activities without restrictions.      History of Present Illness   HPI  Yang Soliman is a 34 y.o. male who presents to my office for first postop follow-up after laparoscopic preperitoneal right inguinal hernia repair with mesh from .  The patient stated doing well, tolerating diet and having regular bowel movement.  The patient denies having any fever, chills, nausea, vomiting, diarrhea, constipation or abdominal pain.       Objective   /68 (BP Location: Left arm, Patient Position: Sitting, Cuff Size: Standard)   Pulse (!) 112   Temp 98.2 °F (36.8 °C)   Resp 18   Ht 6' 4\" (1.93 m)   Wt 97 kg (213 lb 12.8 oz)   SpO2 93%   BMI 26.02 kg/m²      Physical Exam  Vitals and nursing note reviewed.   Abdominal:      Comments: Abdomen soft, nondistended and nontender.  Laparoscopic incisions are well-healed without evidence of infection.  There is no evidence of recurrence of the right inguinal hernia.         "

## (undated) DEVICE — COTTON TIP APPLICTOR 2 PK

## (undated) DEVICE — GLOVE SRG BIOGEL ECLIPSE 7.5

## (undated) DEVICE — GAUZE SPONGES,8 PLY: Brand: CURITY

## (undated) DEVICE — INSUFFLATION NEEDLE TO ESTABLISH PNEUMOPERITONEUM.: Brand: INSUFFLATION NEEDLE

## (undated) DEVICE — ALLENTOWN LAP CHOLE APP PACK: Brand: CARDINAL HEALTH

## (undated) DEVICE — TROCAR: Brand: KII FIOS FIRST ENTRY

## (undated) DEVICE — 3M™ STERI-STRIP™ REINFORCED ADHESIVE SKIN CLOSURES, R1542, 1/4 IN X 1-1/2 IN (6 MM X 38 MM), 6 STRIPS/ENVELOPE: Brand: 3M™ STERI-STRIP™

## (undated) DEVICE — TOWEL SURG XR DETECT GREEN STRL RFD

## (undated) DEVICE — GLOVE INDICATOR PI UNDERGLOVE SZ 7.5 BLUE

## (undated) DEVICE — CHLORAPREP HI-LITE 26ML ORANGE

## (undated) DEVICE — SUT VICRYL 0 UR-6 27 IN J603H

## (undated) DEVICE — INTENDED FOR TISSUE SEPARATION, AND OTHER PROCEDURES THAT REQUIRE A SHARP SURGICAL BLADE TO PUNCTURE OR CUT.: Brand: BARD-PARKER SAFETY BLADES SIZE 15, STERILE

## (undated) DEVICE — SCD SEQUENTIAL COMPRESSION COMFORT SLEEVE MEDIUM KNEE LENGTH: Brand: KENDALL SCD

## (undated) DEVICE — TUBING SMOKE EVAC W/FILTRATION DEVICE PLUMEPORT ACTIV

## (undated) DEVICE — 3M™ STERI-STRIP™ REINFORCED ADHESIVE SKIN CLOSURES, R1546, 1/4 IN X 4 IN (6 MM X 100 MM), 10 STRIPS/ENVELOPE: Brand: 3M™ STERI-STRIP™

## (undated) DEVICE — [HIGH FLOW INSUFFLATOR,  DO NOT USE IF PACKAGE IS DAMAGED,  KEEP DRY,  KEEP AWAY FROM SUNLIGHT,  PROTECT FROM HEAT AND RADIOACTIVE SOURCES.]: Brand: PNEUMOSURE

## (undated) DEVICE — PAD GROUNDING DUAL ADULT

## (undated) DEVICE — SUT VICRYL 4-0 PS-2 27 IN J426H

## (undated) DEVICE — DRAPE EQUIPMENT RF WAND

## (undated) DEVICE — 4-PORT MANIFOLD: Brand: NEPTUNE 2

## (undated) DEVICE — NEPTUNE E-SEP SMOKE EVACUATION PENCIL, COATED, 70MM BLADE, PUSH BUTTON SWITCH: Brand: NEPTUNE E-SEP

## (undated) DEVICE — 3M™ TEGADERM™ TRANSPARENT FILM DRESSING FRAME STYLE, 1624W, 2-3/8 IN X 2-3/4 IN (6 CM X 7 CM), 100/CT 4CT/CASE: Brand: 3M™ TEGADERM™